# Patient Record
Sex: FEMALE | Race: WHITE | NOT HISPANIC OR LATINO | URBAN - METROPOLITAN AREA
[De-identification: names, ages, dates, MRNs, and addresses within clinical notes are randomized per-mention and may not be internally consistent; named-entity substitution may affect disease eponyms.]

---

## 2023-10-24 ENCOUNTER — APPOINTMENT (OUTPATIENT)
Dept: PRIMARY CARE | Facility: CLINIC | Age: 43
End: 2023-10-24
Payer: COMMERCIAL

## 2023-11-09 ENCOUNTER — APPOINTMENT (OUTPATIENT)
Dept: PRIMARY CARE | Facility: CLINIC | Age: 43
End: 2023-11-09
Payer: COMMERCIAL

## 2023-11-14 ENCOUNTER — OFFICE VISIT (OUTPATIENT)
Dept: OBSTETRICS AND GYNECOLOGY | Facility: CLINIC | Age: 43
End: 2023-11-14
Payer: COMMERCIAL

## 2023-11-14 VITALS
WEIGHT: 125 LBS | SYSTOLIC BLOOD PRESSURE: 116 MMHG | BODY MASS INDEX: 22.15 KG/M2 | DIASTOLIC BLOOD PRESSURE: 70 MMHG | HEIGHT: 63 IN

## 2023-11-14 DIAGNOSIS — Z12.31 ENCOUNTER FOR SCREENING MAMMOGRAM FOR BREAST CANCER: ICD-10-CM

## 2023-11-14 DIAGNOSIS — Z01.419 WELL WOMAN EXAM: ICD-10-CM

## 2023-11-14 PROCEDURE — 99386 PREV VISIT NEW AGE 40-64: CPT | Performed by: OBSTETRICS & GYNECOLOGY

## 2023-11-14 PROCEDURE — 87624 HPV HI-RISK TYP POOLED RSLT: CPT

## 2023-11-14 PROCEDURE — 88175 CYTOPATH C/V AUTO FLUID REDO: CPT

## 2023-11-14 RX ORDER — HYDROXYZINE HYDROCHLORIDE 25 MG/1
25 TABLET, FILM COATED ORAL 3 TIMES DAILY PRN
COMMUNITY
Start: 2023-11-09

## 2023-11-14 RX ORDER — BUPROPION HYDROCHLORIDE 150 MG/1
150 TABLET ORAL DAILY
COMMUNITY
Start: 2023-11-09

## 2023-11-14 ASSESSMENT — PAIN SCALES - GENERAL: PAINLEVEL: 0-NO PAIN

## 2023-11-14 NOTE — PROGRESS NOTES
"Naomy Dominguez is a 43 y.o. female who is here for a routine exam. PCP = Monstre Hutchinson, ROSANNA-CNP  Boyfriend for several years  Std screen last year normal    Daughters 24,23, sons 11,9  Moved from [facility]  Worked for mom's [employer] but now not working    Menses : monthly but getting lighter  Contraception : other none  HPV vaccine : No  Last pap : 2020 normal  Last HPV : uncertain  History of abnormal pap : No  Last mammogram : never  History of abnormal mammogram : No  Colon cancer screen : never    ROS  systems reviewed, anything negative noted in HPI    bladder: no dysuria, gross hematuria, urinary frequency, urgency or incontinence  breast: no lumps, nipple d/c, overlying skin changes, redness, or skin retraction    [unfilled]    Past med hx and past surg hx reviewed and notable for: depression/anxiety    Objective   /70   Ht 1.6 m (5' 3\")   Wt 56.7 kg (125 lb)   LMP 11/02/2023   BMI 22.14 kg/m²      General:   Alert and oriented, in no acute distress   Neck: Supple. No visible thyromegaly.    Breast/Axilla: Normal to palpation bilaterally without masses, skin changes, lymphadenopathy, or nipple discharge.    Abdomen: Soft, non-tender, without masses or organomegaly   Vulva: Normal architecture without erythema, masses, or lesions.    Vagina: Normal mucosa without lesions, masses, or atrophy. No abnormal vaginal discharge.    Cervix: Normal without masses, lesions, or signs of cervicitis.    Uterus: Normal mobile, non-enlarged uterus    Adnexa: Normal without masses or lesions   Pelvic Floor No POP noted.    Psych Normal affect. Normal mood.      Thank you for coming to your annual exam. Your findings during the exam were normal. Specific topics addressed during this exam included: healthy lifestyle, well woman screening guidelines,     Actions performed during this visit include:  - Clinical breast exam  - Clinical pelvic exam  -pap/hpv  - follow up with PCP for colon cancer " screen    Orders Placed This Encounter   Procedures    BI mammo bilateral screening tomosynthesis           Please return for your next visit in 1 year.

## 2023-11-20 PROBLEM — J30.2 SEASONAL ALLERGIC RHINITIS: Status: ACTIVE | Noted: 2023-11-20

## 2023-11-20 PROBLEM — R61 NIGHT SWEATS: Status: ACTIVE | Noted: 2023-11-20

## 2023-11-20 PROBLEM — M25.529 ELBOW PAIN: Status: ACTIVE | Noted: 2023-11-20

## 2023-11-20 PROBLEM — L03.119 CELLULITIS AND ABSCESS OF HAND, EXCEPT FINGERS AND THUMB: Status: ACTIVE | Noted: 2023-11-20

## 2023-11-20 PROBLEM — H57.10 PAIN IN EYE: Status: ACTIVE | Noted: 2023-11-20

## 2023-11-20 PROBLEM — S90.30XA CONTUSION OF FOOT: Status: ACTIVE | Noted: 2023-11-20

## 2023-11-20 PROBLEM — S09.90XA INJURY OF HEAD: Status: ACTIVE | Noted: 2023-11-20

## 2023-11-20 PROBLEM — G47.9 SLEEP DISTURBANCE: Status: ACTIVE | Noted: 2023-11-20

## 2023-11-20 PROBLEM — R06.83 SNORING: Status: ACTIVE | Noted: 2023-11-20

## 2023-11-20 PROBLEM — G47.33 OBSTRUCTIVE SLEEP APNEA: Status: ACTIVE | Noted: 2023-11-20

## 2023-11-20 PROBLEM — M25.559 ARTHRALGIA OF HIP: Status: ACTIVE | Noted: 2023-11-20

## 2023-11-20 PROBLEM — S99.929A INJURY OF FOOT: Status: ACTIVE | Noted: 2023-11-20

## 2023-11-20 PROBLEM — T14.8XXA PUNCTURE WOUND: Status: ACTIVE | Noted: 2023-11-20

## 2023-11-20 PROBLEM — M79.646 PAIN IN FINGER: Status: ACTIVE | Noted: 2023-11-20

## 2023-11-20 PROBLEM — L02.519 CELLULITIS AND ABSCESS OF HAND, EXCEPT FINGERS AND THUMB: Status: ACTIVE | Noted: 2023-11-20

## 2023-11-20 PROBLEM — R53.83 FATIGUE: Status: ACTIVE | Noted: 2023-11-20

## 2023-11-20 PROBLEM — S62.639A CLOSED FRACTURE OF DISTAL PHALANX OF FINGER: Status: ACTIVE | Noted: 2023-11-20

## 2023-11-20 PROBLEM — M54.17 THORACIC AND LUMBOSACRAL NEURITIS: Status: ACTIVE | Noted: 2023-11-20

## 2023-11-20 PROBLEM — R73.01 ELEVATED FASTING BLOOD SUGAR: Status: ACTIVE | Noted: 2023-11-20

## 2023-11-20 PROBLEM — S05.00XA CORNEAL ABRASION: Status: ACTIVE | Noted: 2023-11-20

## 2023-11-20 PROBLEM — R07.89 CHEST DISCOMFORT: Status: ACTIVE | Noted: 2023-11-20

## 2023-11-20 PROBLEM — M54.14 THORACIC AND LUMBOSACRAL NEURITIS: Status: ACTIVE | Noted: 2023-11-20

## 2023-11-20 PROBLEM — M79.89 LEG SWELLING: Status: ACTIVE | Noted: 2023-11-20

## 2023-11-20 PROBLEM — M54.2 NECK PAIN: Status: ACTIVE | Noted: 2023-11-20

## 2023-11-20 PROBLEM — S69.90XA INJURY OF HAND: Status: ACTIVE | Noted: 2023-11-20

## 2024-03-25 ENCOUNTER — OFFICE VISIT (OUTPATIENT)
Dept: ORTHOPEDIC SURGERY | Facility: CLINIC | Age: 44
End: 2024-03-25
Payer: COMMERCIAL

## 2024-03-25 ENCOUNTER — HOSPITAL ENCOUNTER (OUTPATIENT)
Dept: RADIOLOGY | Facility: CLINIC | Age: 44
Discharge: HOME | End: 2024-03-25
Payer: COMMERCIAL

## 2024-03-25 VITALS — BODY MASS INDEX: 19.63 KG/M2 | WEIGHT: 115 LBS | HEIGHT: 64 IN

## 2024-03-25 DIAGNOSIS — M25.572 ACUTE LEFT ANKLE PAIN: ICD-10-CM

## 2024-03-25 PROCEDURE — 27786 TREATMENT OF ANKLE FRACTURE: CPT | Performed by: FAMILY MEDICINE

## 2024-03-25 PROCEDURE — 73610 X-RAY EXAM OF ANKLE: CPT | Mod: LEFT SIDE | Performed by: RADIOLOGY

## 2024-03-25 PROCEDURE — L4361 PNEUMA/VAC WALK BOOT PRE OTS: HCPCS | Performed by: FAMILY MEDICINE

## 2024-03-25 PROCEDURE — 73610 X-RAY EXAM OF ANKLE: CPT | Mod: LT

## 2024-03-25 PROCEDURE — 99203 OFFICE O/P NEW LOW 30 MIN: CPT | Performed by: FAMILY MEDICINE

## 2024-03-25 NOTE — PROGRESS NOTES
History of Present Illness   Chief Complaint   Patient presents with    Left Ankle - Injury     Twisted her ankle playing hockey 3/21/24       The patient is 43 y.o. female  here with a complaint of left ankle pain after a fall while ice skating. With inversion injury. Currently, she denies any pain but does have stiffness in ROM of her ankle. After her injury she had significant swelling and bruising along lateral foot.     Of note, moving to [place] in 1 months    Past Medical History:   Diagnosis Date    Cellulitis     Depression     Fatigue     Night sweats     DARA (obstructive sleep apnea)     Snoring        Medication Documentation Review Audit       Reviewed by Tiesha Jameson LPN (Licensed Nurse) on 11/14/23 at 0937      Medication Order Taking? Sig Documenting Provider Last Dose Status   buPROPion XL (Wellbutrin XL) 150 mg 24 hr tablet [id number] Yes Take 1 tablet (150 mg) by mouth once daily. Historical Provider, MD  Active   hydrOXYzine HCL (Atarax) 25 mg tablet [id number] Yes Take 1 tablet (25 mg) by mouth 3 times a day as needed for anxiety. Historical Provider, MD  Active                    No Known Allergies    Social History     Socioeconomic History    Marital status: Single     Spouse name: Not on file    Number of children: Not on file    Years of education: Not on file    Highest education level: Not on file   Occupational History    Not on file   Tobacco Use    Smoking status: Every Day     Types: Cigarettes    Smokeless tobacco: Never   Substance and Sexual Activity    Alcohol use: Yes    Drug use: Yes     Types: Marijuana    Sexual activity: Not on file   Other Topics Concern    Not on file   Social History Narrative    Not on file     Social Determinants of Health     Financial Resource Strain: Not on file   Food Insecurity: Not on file   Transportation Needs: Not on file   Physical Activity: Not on file   Stress: Not on file   Social Connections: Not on file   Intimate Partner Violence: Not  on file   Housing Stability: Not on file       Past Surgical History:   Procedure Laterality Date     SECTION, LOW TRANSVERSE      CT ANGIO NECK  2021    CT NECK ANGIO W AND WO IV [facility]          Review of Systems   GENERAL: Negative  GI: Negative  MUSCULOSKELETAL: See HPI  SKIN: Negative  NEURO:  Negative     Physical Exam:    General/Constitutional: well appearing, no distress, appears stated age  HEENT: sclera clear  Respiratory: non labored breathing  Vascular: No edema, swelling or tenderness, except as noted in detailed exam.  Integumentary: No impressive skin lesions present, except as noted in detailed exam.  Neurological:  Alert and oriented   Psychological:  Normal mood and affect.  Musculoskeletal: Normal, except as noted in detailed exam and in HPI    Swelling noted in dorsal surface of the foot   Lateral foot bruising  No pain in palpation.  Sensation intact   Rom limited in ankle   Tibial pulse 2+  DP pulse appreciated with ultrasound doppler       Imaging  Xray of left ankle noted to have fibular fracture     Assessment   1. Acute left ankle pain  XR ankle left 3+ views            Plan  - Boot weightbearing as tolerated   - Discussed activity modification  - Tylenol prn for pain  - follow up in 2 weeks

## 2024-04-18 ENCOUNTER — OFFICE VISIT (OUTPATIENT)
Dept: PRIMARY CARE | Facility: CLINIC | Age: 44
End: 2024-04-18
Payer: COMMERCIAL

## 2024-04-18 VITALS
HEART RATE: 66 BPM | BODY MASS INDEX: 19.87 KG/M2 | DIASTOLIC BLOOD PRESSURE: 63 MMHG | WEIGHT: 116.4 LBS | HEIGHT: 64 IN | SYSTOLIC BLOOD PRESSURE: 99 MMHG

## 2024-04-18 DIAGNOSIS — Z71.6 ENCOUNTER FOR TOBACCO USE CESSATION COUNSELING: ICD-10-CM

## 2024-04-18 DIAGNOSIS — F33.0 MILD EPISODE OF RECURRENT MAJOR DEPRESSIVE DISORDER (CMS-HCC): ICD-10-CM

## 2024-04-18 DIAGNOSIS — J30.2 SEASONAL ALLERGIES: ICD-10-CM

## 2024-04-18 DIAGNOSIS — Z00.00 ANNUAL PHYSICAL EXAM: Primary | ICD-10-CM

## 2024-04-18 DIAGNOSIS — F90.8 ATTENTION DEFICIT HYPERACTIVITY DISORDER (ADHD), OTHER TYPE: ICD-10-CM

## 2024-04-18 PROBLEM — F90.9 ATTENTION DEFICIT HYPERACTIVITY DISORDER (ADHD): Status: ACTIVE | Noted: 2024-04-18

## 2024-04-18 PROCEDURE — 99386 PREV VISIT NEW AGE 40-64: CPT | Performed by: INTERNAL MEDICINE

## 2024-04-18 RX ORDER — DEXTROAMPHETAMINE SACCHARATE, AMPHETAMINE ASPARTATE MONOHYDRATE, DEXTROAMPHETAMINE SULFATE AND AMPHETAMINE SULFATE 2.5; 2.5; 2.5; 2.5 MG/1; MG/1; MG/1; MG/1
10 CAPSULE, EXTENDED RELEASE ORAL
COMMUNITY

## 2024-04-18 RX ORDER — AZELASTINE 1 MG/ML
1 SPRAY, METERED NASAL 2 TIMES DAILY
COMMUNITY

## 2024-04-18 RX ORDER — BUDESONIDE AND FORMOTEROL FUMARATE DIHYDRATE 80; 4.5 UG/1; UG/1
2 AEROSOL RESPIRATORY (INHALATION)
COMMUNITY

## 2024-04-18 RX ORDER — MICONAZOLE NITRATE 2 %
2 CREAM (GRAM) TOPICAL AS NEEDED
Qty: 100 EACH | Refills: 1 | Status: SHIPPED | OUTPATIENT
Start: 2024-04-18 | End: 2024-05-18

## 2024-04-18 ASSESSMENT — ENCOUNTER SYMPTOMS
COUGH: 0
NAUSEA: 0
DIZZINESS: 1
SHORTNESS OF BREATH: 0
HEADACHES: 1
FATIGUE: 0
CONSTIPATION: 0
BLOOD IN STOOL: 0
ABDOMINAL PAIN: 0
SLEEP DISTURBANCE: 0

## 2024-04-18 NOTE — ASSESSMENT & PLAN NOTE
-Used to smoke a ppd until going on wellbutrin 2 years ago. Since then is now smoking a 1/2 ppd.  -Is interested in quitting. Doesn't like the patch because it gave her nightmares, but can tolerate nicotine gum. Discussed chantix, but would like to hold off at this time. Script for nicotine gum sent to pharmacy.

## 2024-04-18 NOTE — ASSESSMENT & PLAN NOTE
-Follows with Dr. Lopez.  -Taking montelukast, symbicort, flonase, azelastine for allergies. Symptoms controlled.

## 2024-04-18 NOTE — PROGRESS NOTES
"Subjective   Patient ID: Naomy Dominguez is a 43 y.o. female who presents for [facility].    Here to get established. Hasn't seen a PCP (Josse Jasper was hers previously before he left the [facility]) for around 3 years.    PMH:  -Seasonal allergies: Taking montelukast and symbicort. Sees Dr. Lopez.  -ADHD: Sees Dr. Lorenzo. Takes adderall.  -Depression: Controlled on wellbutrin.  -Tobacco use: Tried a nicotine patch in the past w/ poor side effects. Using the nicotine gum helped more though.        Review of Systems   Constitutional:  Negative for fatigue.   Eyes:  Negative for visual disturbance.   Respiratory:  Negative for cough and shortness of breath.    Cardiovascular:  Negative for chest pain.   Gastrointestinal:  Negative for abdominal pain, blood in stool, constipation and nausea.   Neurological:  Positive for dizziness (if getting up quickly) and headaches.   Psychiatric/Behavioral:  Negative for sleep disturbance.        BP 99/63   Pulse 66   Ht 1.626 m (5' 4\")   Wt 52.8 kg (116 lb 6.4 oz)   BMI 19.98 kg/m²   Objective   Physical Exam  Constitutional:       General: She is not in acute distress.     Appearance: She is not ill-appearing, toxic-appearing or diaphoretic.   HENT:      Head: Normocephalic and atraumatic.   Eyes:      Conjunctiva/sclera: Conjunctivae normal.   Cardiovascular:      Rate and Rhythm: Normal rate and regular rhythm.      Heart sounds: No murmur heard.     No friction rub. No gallop.   Pulmonary:      Effort: Pulmonary effort is normal. No respiratory distress.      Breath sounds: No stridor. No wheezing, rhonchi or rales.   Abdominal:      General: Abdomen is flat. Bowel sounds are normal. There is no distension.      Palpations: Abdomen is soft.      Tenderness: There is no abdominal tenderness. There is no guarding.   Musculoskeletal:         General: Signs of injury (L ankle in boot) present.      Cervical back: Normal range of motion. No rigidity or " tenderness.   Lymphadenopathy:      Cervical: No cervical adenopathy.   Skin:     General: Skin is warm and dry.   Neurological:      Mental Status: She is alert.         Assessment/Plan   Problem List Items Addressed This Visit             ICD-10-CM    Depression F32.A     -Well controlled on wellbutrin.         Attention deficit hyperactivity disorder (ADHD) F90.9     -Follows with Dr. Lorenzo. Symptoms controlled on adderall.         Encounter for tobacco use cessation counseling Z71.6     -Used to smoke a ppd until going on wellbutrin 2 years ago. Since then is now smoking a 1/2 ppd.  -Is interested in quitting. Doesn't like the patch because it gave her nightmares, but can tolerate nicotine gum. Discussed chantix, but would like to hold off at this time. Script for nicotine gum sent to pharmacy.         Relevant Medications    nicotine polacrilex (Nicorette) 2 mg gum     Other Visit Diagnoses         Codes    Annual physical exam    -  Primary Z00.00    Relevant Orders    BI mammo bilateral screening tomosynthesis    Lipid panel    Hemoglobin A1c    Comprehensive metabolic panel    CBC    Tsh With Reflex To Free T4 If Abnormal    Colonoscopy Screening; Average Risk Patient    Seasonal allergies     J30.2        -Labwork as above.  -Mammogram and colonoscopy ordered. Pt agreeable to getting both done. Mammogram ordered by Dr. Snyder 11/2023 but never done. Pt says she was never contacted to get it scheduled. New order placed.  -To return in 6 months.         Lisa Barajas MD 04/18/24 3:52 PM

## 2024-04-22 ENCOUNTER — OFFICE VISIT (OUTPATIENT)
Dept: ORTHOPEDIC SURGERY | Facility: CLINIC | Age: 44
End: 2024-04-22
Payer: COMMERCIAL

## 2024-04-22 ENCOUNTER — HOSPITAL ENCOUNTER (OUTPATIENT)
Dept: RADIOLOGY | Facility: CLINIC | Age: 44
Discharge: HOME | End: 2024-04-22
Payer: COMMERCIAL

## 2024-04-22 ENCOUNTER — HOSPITAL ENCOUNTER (OUTPATIENT)
Dept: RADIOLOGY | Facility: CLINIC | Age: 44
End: 2024-04-22
Payer: COMMERCIAL

## 2024-04-22 VITALS — HEIGHT: 64 IN | BODY MASS INDEX: 19.87 KG/M2 | WEIGHT: 116.4 LBS

## 2024-04-22 DIAGNOSIS — M25.572 ACUTE LEFT ANKLE PAIN: ICD-10-CM

## 2024-04-22 DIAGNOSIS — S82.65XD CLOSED NONDISP FRACTURE OF LEFT LATERAL MALLEOLUS WITH ROUTINE HEALING: Primary | ICD-10-CM

## 2024-04-22 PROCEDURE — 73610 X-RAY EXAM OF ANKLE: CPT | Mod: LT

## 2024-04-22 PROCEDURE — 73610 X-RAY EXAM OF ANKLE: CPT | Mod: LEFT SIDE | Performed by: RADIOLOGY

## 2024-04-22 PROCEDURE — 99024 POSTOP FOLLOW-UP VISIT: CPT | Performed by: FAMILY MEDICINE

## 2024-04-22 NOTE — PROGRESS NOTES
History of Present Illness   Chief Complaint   Patient presents with    Left Ankle - Follow-up     Twisted her ankle playing hockey 3/21/24       The patient is 43 y.o. female  here for follow-up of left ankle nondisplaced Kwan B lateral malleolus fracture.  Injury occurred approximately 1 month ago after a fall ice-skating.  Treatment has been conservative with immobilization in walking boot, she has been weightbearing as tolerated.  She has been compliant with immobilization.  She admits to good improvement in symptoms over the past 1 month.  She says that swelling and bruising have resolved.  She is getting ready to move to [place]      Past Medical History:   Diagnosis Date    ADHD (attention deficit hyperactivity disorder) 11/21    Allergic 12/23    Anxiety 2007    Cellulitis     Depression     Fatigue     Night sweats     DARA (obstructive sleep apnea)     Snoring        Medication Documentation Review Audit       Reviewed by Lisa Barajas MD (Physician) on 04/18/24 at 1524      Medication Order Taking? Sig Documenting Provider Last Dose Status   amphetamine-dextroamphetamine XR (Adderall XR) 10 mg 24 hr capsule [id number] Yes Take 1 capsule (10 mg) by mouth 2 times a day with meals. Do not crush or chew. Historical Provider, MD Taking Active   azelastine (Astelin) 137 mcg (0.1 %) nasal spray [id number] Yes Administer 1 spray into each nostril 2 times a day. Use in each nostril as directed Historical Provider, MD Taking Active   budesonide-formoteroL (Symbicort) 80-4.5 mcg/actuation inhaler [id number]  Inhale 2 puffs 2 times a day. Rinse mouth with water after use to reduce aftertaste and incidence of candidiasis. Do not swallow. Historical Provider, MD  Active   buPROPion XL (Wellbutrin XL) 150 mg 24 hr tablet [id number] Yes Take 1 tablet (150 mg) by mouth once daily. Historical Provider, MD Taking Active   Discontinued 04/18/24 1523   fluticasone (Flonase) 50 mcg/actuation nasal spray [id number] Yes once every  24 hours. Historical Provider, MD Taking Active   hydrOXYzine HCL (Atarax) 25 mg tablet [id number] Yes Take 1 tablet (25 mg) by mouth 3 times a day as needed for anxiety. Historical Provider, MD Taking Active   montelukast (Singulair) 10 mg tablet [id number] Yes once every 24 hours. Historical Provider, MD Taking Active   norgestimate-ethinyl estradioL (Ortho-Cyclen) 0.25-35 mg-mcg tablet [id number] No Take 1 tablet by mouth once daily. Historical Provider, MD Not Taking Active   Discontinued 24 1524   Saline NasaL 0.65 % nasal spray [id number] Yes INSTILL ONE SPRAY INTO BOTH NOSTRILS TWICE A DAY Historical Provider, MD Taking Active   Discontinued 24 1524    Discontinued 24 1524                    No Known Allergies    Social History     Socioeconomic History    Marital status: Single     Spouse name: Not on file    Number of children: Not on file    Years of education: Not on file    Highest education level: Not on file   Occupational History    Not on file   Tobacco Use    Smoking status: Every Day     Current packs/day: 0.50     Average packs/day: 0.9 packs/day for 17.3 years (16.2 ttl pk-yrs)     Types: Cigarettes     Start date:     Smokeless tobacco: Never   Substance and Sexual Activity    Alcohol use: Yes     Comment: Once every few weeks.    Drug use: Yes     Types: Marijuana    Sexual activity: Yes     Partners: Male     Birth control/protection: None   Other Topics Concern    Not on file   Social History Narrative    Not on file     Social Determinants of Health     Financial Resource Strain: Not on file   Food Insecurity: Not on file   Transportation Needs: Not on file   Physical Activity: Not on file   Stress: Not on file   Social Connections: Not on file   Intimate Partner Violence: Not on file   Housing Stability: Not on file       Past Surgical History:   Procedure Laterality Date     SECTION, LOW TRANSVERSE      CT ANGIO NECK  2021    CT NECK ANGIO W AND WO IV  [facility]          Review of Systems   GENERAL: Negative  GI: Negative  MUSCULOSKELETAL: See HPI  SKIN: Negative  NEURO:  Negative     Physical Exam:    General/Constitutional: well appearing, no distress, appears stated age  HEENT: sclera clear  Respiratory: non labored breathing  Vascular: No edema, swelling or tenderness, except as noted in detailed exam.  Integumentary: No impressive skin lesions present, except as noted in detailed exam.  Neurological:  Alert and oriented   Psychological:  Normal mood and affect.  Musculoskeletal: Normal, except as noted in detailed exam and in HPI    Left ankle: Soft tissue swelling and ecchymosis have resolved.  Nontender to palpation at fracture site at distal fibula, no other areas of tenderness to palpation.  She has good range of motion at the ankle, no equal to right in all directions.  No motor deficits are present, she admits to some mild discomfort with resisted ankle eversion.  Left lower extremity is neurovascular intact.     Imaging  Repeat x-rays of left ankle obtained today and independently reviewed, there is evidence of a healing, nondisplaced oblique Kwan B lateral malleolus fracture, there is some bridging callus formation seen best on oblique view    Assessment   1. Acute left ankle pain  XR ankle left 3+ views      2. Closed nondisp fracture of left lateral malleolus with routine healing            4 weeks out from injury, doing well, good clinical and radiographic evidence of healing    Plan: Patient is cleared to begin weaning herself out of the boot over the next week or so.  Gradual return to activity including walking and hiking over the next 3 to 4 weeks as tolerated by symptoms.  Patient will follow-up as symptoms dictate.

## 2024-10-21 ENCOUNTER — APPOINTMENT (OUTPATIENT)
Dept: PRIMARY CARE | Facility: CLINIC | Age: 44
End: 2024-10-21
Payer: COMMERCIAL

## 2025-04-02 ENCOUNTER — APPOINTMENT (OUTPATIENT)
Dept: OBSTETRICS AND GYNECOLOGY | Facility: CLINIC | Age: 45
End: 2025-04-02
Payer: COMMERCIAL